# Patient Record
(demographics unavailable — no encounter records)

---

## 2025-07-28 NOTE — HISTORY OF PRESENT ILLNESS
[FreeTextEntry1] : REASON FOR VISIT: Termination of Pregnancy   HISTORY OF PRESENT ILLNESS   Patient is a 31yo  LMP25  who presents for initial consultation for pregnancy termination.   Duration: LMP 24 [x] Patient has not yet had an ultrasound during this pregnancy [ ] Patient had an ultrasound during this pregnancy    Patient reports they care currently experiencing the following pregnancy symptoms: [ ] none [ ] nausea [ ] abdominal/pelvic pain [x] breast soreness [ ] vaginal bleeding [ ] Other:   The patient has told  her friends about the pregnancy and  decision and has good support.  The patient is firm in their decision to proceed with termination. No one has pressured them into this decision.    __________________________________________________________________   MEDICATION  ELIGIBILITY SCREEN   Requirements: (Any "no" answers means not eligible)    IUP with gestational age < 77 days (11 weeks): YES  Willing to undergo surgical  if medical  fails:YES  Has an adult support person available after misoprostol administration:YES  Has access to a phone at all times:YES  Able and agrees to follow up plan:YES    Exclusions: (Any "yes" answer means may not be eligible)   Known allergy to mifepristone/misoprostol: NO  Current anticoagulant therapy: NO  Personal history of clotting disorder: NO  Current use of oral corticosteroids: NO  Chronic adrenal failure: NO  Personal history of porphyria: NO  IUD in place: NO  Anemia (hemoglobin = 9.5 g/dL) or symptoms/risk factors for anemia: NO  Heart disease with impaired function requiring medications: NO  Known large fibroid uterus: NO  Other serious medical problem:  NO  __________________________________________________________________   OBSTETRIC HISTORY:   x1,  iTOP via D&C x1, Ectopic pregnancy with right salpingectomy  Complications of prior pregnancies: denies  History of Prior Transfusions: [x] No [ ] Yes    CONTRACEPTIVE HISTORY Prior methods used:   MEDICAL HISTORY: [ ] Asthma [ ] HTN [ ] h/o VTE [ ] recent hospitalizations [x] Other PMH:  Low blood pressure, hypoglycemia   SUBSTANCE USE HISTORY [ ] Alcohol  [ ] Cigarettes [ ] vaping [x] marijuana [ ] opioid use or treatment [ ] other [ ] Denies history of current or present substance use    PSYCH HISTORY:  [ ] Anxiety [ ] Depression  [ ] ADHD [ ] Other: [x] Denies any history of suspected or treated psychiatric conditions   SURGICAL HISTORY: D&C x1, right salpingectomy for ectopic pregnancy    MEDICATIONS: Denies  ALLERGIES AND REACTION:  NKDA      SOCIAL HISTORY:  Lives with her 13yo son, feels safe at home, works at the [employer],  L fore head fell down un witnesses bruising periorbital area

## 2025-07-28 NOTE — PHYSICAL EXAM
[Appropriately responsive] : appropriately responsive [Alert] : alert [No Acute Distress] : no acute distress [Soft] : soft [Non-tender] : non-tender [Non-distended] : non-distended [No Lesions] : no lesions [No Mass] : no mass [Oriented x3] : oriented x3 [FreeTextEntry2] : GREGG Moreno  [Labia Majora] : normal [Normal] : normal

## 2025-07-28 NOTE — PLAN
[FreeTextEntry1] : TAUS: likely sac like structure  TVUS: +GS, +YS, +CRL c/w 6w with cardiac activity, + SIUP   A/P: 31yo  @ 6w by US who presents for medication  for pregnancy termination.   Options Counseling: The patient was counseled about her pregnancy options of parenthood, adoption and . She expressed her sure decision for pregnancy termination.   The patient was counseled on medication vs procedural management and wishes to proceed with medication termination of pregnancy.   Risk of medication , including but not limited to: infection, retained products of conception, continuing pregnancy, hemorrhage, need for surgical  or D&C for incomplete  (~5%), potential for teratogenesis in this pregnancy if treatment unsuccessful, blood transfusion, need for further surgery. Discussed side effects, warning symptoms and pain management. Patient agreement forms were signed after discussion of risks and benefits of all management options.  Extensive bleeding and infection precautions were reviewed and written instructions were given to her.  Emergency contact information was provided.     PLAN   #Medication  protocol reviewed in detail.   Mifepristone 200 mg (see RN note) was dispensed for oral administration in the clinic today after counseling and review of consent forms.  They were given prescriptions for misoprostol 800 mcg, which they will use on Friday to start the medication process over the weekend.    They were given prescriptions for ibuprofen 800 mg for pain as well as promethazine for nausea.  They were advised to take a dose of promethazine with their first dose of ibuprofen (before or at the time of taking misoprostol) to potentially help with pain.     #Labs: CBC, T&S   #STI screening: Patient was offered screening for sexually transmitted infections accepts, also reports increased discharge   Give scheduling limitation patient given options for follow up.  HCG level today, HCG level 1 week after starting medication , 1 week later  Discussed limitations due to not knowing height of HCG, and there require more than 1 value to ensure level is decreasing appropriately, if not recommend in person visit  Declines discussing contraception today